# Patient Record
Sex: FEMALE
[De-identification: names, ages, dates, MRNs, and addresses within clinical notes are randomized per-mention and may not be internally consistent; named-entity substitution may affect disease eponyms.]

---

## 2018-01-26 ENCOUNTER — HOSPITAL ENCOUNTER (EMERGENCY)
Dept: HOSPITAL 14 - H.ER | Age: 63
LOS: 1 days | Discharge: HOME | End: 2018-01-27
Payer: MEDICARE

## 2018-01-26 VITALS — TEMPERATURE: 98 F

## 2018-01-26 VITALS — DIASTOLIC BLOOD PRESSURE: 58 MMHG | SYSTOLIC BLOOD PRESSURE: 112 MMHG

## 2018-01-26 VITALS — RESPIRATION RATE: 18 BRPM

## 2018-01-26 DIAGNOSIS — K52.9: Primary | ICD-10-CM

## 2018-01-26 LAB
ALBUMIN SERPL-MCNC: 4.2 G/DL (ref 3.5–5)
ALBUMIN/GLOB SERPL: 1 {RATIO} (ref 1–2.1)
ALT SERPL-CCNC: 77 U/L (ref 9–52)
AST SERPL-CCNC: 75 U/L (ref 14–36)
BASOPHILS # BLD AUTO: 0 K/UL (ref 0–0.2)
BASOPHILS NFR BLD: 0.5 % (ref 0–2)
BUN SERPL-MCNC: 24 MG/DL (ref 7–17)
CALCIUM SERPL-MCNC: 9.3 MG/DL (ref 8.4–10.2)
EOSINOPHIL # BLD AUTO: 0.1 K/UL (ref 0–0.7)
EOSINOPHIL NFR BLD: 0.5 % (ref 0–4)
ERYTHROCYTE [DISTWIDTH] IN BLOOD BY AUTOMATED COUNT: 14.7 % (ref 11.5–14.5)
GFR NON-AFRICAN AMERICAN: > 60
HGB BLD-MCNC: 12.1 G/DL (ref 12–16)
LIPASE SERPL-CCNC: 300 U/L (ref 23–300)
LYMPHOCYTES # BLD AUTO: 1.1 K/UL (ref 1–4.3)
LYMPHOCYTES NFR BLD AUTO: 11.2 % (ref 20–40)
MCH RBC QN AUTO: 28.3 PG (ref 27–31)
MCHC RBC AUTO-ENTMCNC: 31.9 G/DL (ref 33–37)
MCV RBC AUTO: 88.8 FL (ref 81–99)
MONOCYTES # BLD: 0.7 K/UL (ref 0–0.8)
MONOCYTES NFR BLD: 6.7 % (ref 0–10)
NEUTROPHILS # BLD: 8.3 K/UL (ref 1.8–7)
NEUTROPHILS NFR BLD AUTO: 81.1 % (ref 50–75)
NRBC BLD AUTO-RTO: 0.1 % (ref 0–0)
PLATELET # BLD: 151 K/UL (ref 130–400)
PMV BLD AUTO: 11.5 FL (ref 7.2–11.7)
RBC # BLD AUTO: 4.29 MIL/UL (ref 3.8–5.2)
WBC # BLD AUTO: 10.2 K/UL (ref 4.8–10.8)

## 2018-01-26 PROCEDURE — 84484 ASSAY OF TROPONIN QUANT: CPT

## 2018-01-26 PROCEDURE — 96361 HYDRATE IV INFUSION ADD-ON: CPT

## 2018-01-26 PROCEDURE — 80053 COMPREHEN METABOLIC PANEL: CPT

## 2018-01-26 PROCEDURE — 85025 COMPLETE CBC W/AUTO DIFF WBC: CPT

## 2018-01-26 PROCEDURE — 99285 EMERGENCY DEPT VISIT HI MDM: CPT

## 2018-01-26 PROCEDURE — 82948 REAGENT STRIP/BLOOD GLUCOSE: CPT

## 2018-01-26 PROCEDURE — 83690 ASSAY OF LIPASE: CPT

## 2018-01-26 PROCEDURE — 87804 INFLUENZA ASSAY W/OPTIC: CPT

## 2018-01-26 PROCEDURE — 96374 THER/PROPH/DIAG INJ IV PUSH: CPT

## 2018-01-26 PROCEDURE — 96375 TX/PRO/DX INJ NEW DRUG ADDON: CPT

## 2018-01-26 NOTE — ED PDOC
HPI:Nausea, Vomiting, Diarrhea


Time Seen by Provider: 01/26/18 22:11


Chief Complaint (Nursing): GI Problem


Chief Complaint (Provider): GI Problem


History Per: Patient


History/Exam Limitations: no limitations


Onset/Duration Of Symptoms: Hrs (x4-5)


Current Symptoms Are (Timing): Still Present


Additional Complaint(s): 





62 year old female with medical history of gastritis, who presents to the 

emergency department with a complaint of recurrent nausea and weakness 

associated with near-syncope, 2 episodes of non-bloody diarrhea and 3 episodes 

of vomiting ongoing since [id number] earlier today. Son reported that patient 

briefly loss consciousness mid-vomiting, however, patient stated she was fully 

aware during that episode. Denied any fever, chills, cough, shortness of breath 

or chest pain. Of note, patient was also given NS 200cc and Zofran 4mg IV by 

EMS on field. 





PMD: none provided





Past Medical History


Reviewed: Historical Data, Nursing Documentation, Vital Signs


Vital Signs: 


 Last Vital Signs











Temp  98.0 F   01/26/18 22:09


 


Pulse  72   01/26/18 22:25


 


Resp  18   01/26/18 22:25


 


BP  142/97 H  01/26/18 22:25


 


Pulse Ox  98   01/26/18 22:25














- Medical History


PMH: Gastritis





- Surgical History


Surgical History: No Surg Hx





- Family History


Family History: States: Unknown Family Hx





- Social History


Current smoker - smoking cessation education provided: No


Alcohol: None


Drugs: Denies





- Home Medications


Home Medications: 


 Ambulatory Orders











 Medication  Instructions  Recorded


 


Dicyclomine [Bentyl] 20 mg PO Q12 PRN #20 tab 01/27/18


 


Ondansetron ODT [Zofran ODT] 4 mg PO Q6 PRN #8 odt 01/27/18














- Allergies


Allergies/Adverse Reactions: 


 Allergies











Allergy/AdvReac Type Severity Reaction Status Date / Time


 


No Known Allergies Allergy   Verified 01/26/18 22:09














Review of Systems


ROS Statement: Except As Marked, All Systems Reviewed And Found Negative


Constitutional: Positive for: Weakness.  Negative for: Fever, Chills


Cardiovascular: Negative for: Chest Pain


Respiratory: Negative for: Cough, Shortness of Breath


Gastrointestinal: Positive for: Nausea, Vomiting (x3), Diarrhea (nonbloody x2)


Neurological: Positive for: Dizziness (near syncope)





Physical Exam





- Reviewed


Nursing Documentation Reviewed: Yes


Vital Signs Reviewed: Yes





- Physical Exam


Appears: Positive for: Non-toxic, Uncomfortable


Head Exam: Positive for: ATRAUMATIC, NORMAL INSPECTION, NORMOCEPHALIC


Skin: Positive for: Normal Color


Eye Exam: Positive for: Normal appearance, EOMI, PERRL.  Negative for: Nystagmus


ENT: Positive for: Normal ENT Inspection


Neck: Positive for: Normal, Painless ROM


Cardiovascular/Chest: Positive for: Chest Non Tender, Tachycardia.  Negative for

: Regular Rate, Rhythm, Bradycardia


Respiratory: Positive for: Normal Breath Sounds.  Negative for: Decreased 

Breath Sounds, Respiratory Distress


Gastrointestinal/Abdominal: Positive for: Normal Exam, Soft.  Negative for: 

Tenderness


Extremity: Positive for: Normal ROM (upper/lower)


Neurologic/Psych: Positive for: Alert (x3), Oriented





- Laboratory Results


Result Diagrams: 


 01/26/18 22:30





 01/26/18 22:30





- ECG


O2 Sat by Pulse Oximetry: 98 (RA)


Pulse Ox Interpretation: Normal





Medical Decision Making


Medical Decision Making: 





Initial Impression: vomiting; diarrhea; near-syncopal episode





Initial Plan:


* Alcohol serum


* CMP


* Drug screen, urine


* Lipase


* Troponin I 


* Urine dipstick


* CBC


* Bentyl 20mg PO


* Potassium chloride 100ml IVPB


* NS 1,000ml IV per 1,000mls/hr


* Accucheck


* Influenza A B


________________________________________________________________________________

_____





Time: 2225


--Rapid flu: negative for influenza


________________________________________________________________________________

_____





Time: 0134


--Upon provider reevaluation, patient is feeling better, medically stable and 

requires no further treatment in the ED at this time. Labs revealed no 

significant clinical abnormality. Patient will be discharged home with Rx for 

Bentyl 20mg and Zofran 4mg. Counseling was provided and all questions were 

answered regarding diagnosis. There is agreement to discharge plan. Return if 

symptoms persist or worsen.





Clinical Impression: Gastroenteritis





--------------------------------------------------------------------------------

-----------------


Scribe Attestation:


Documented by Ptaricia Avalos, acting as a scribe for Randolph Vega MD.





Provider Scribe Attestation:


All medical record entries made by the Scribe were at my direction and 

personally dictated by me. I have reviewed the chart and agree that the record 

accurately reflects my personal performance of the history, physical exam, 

medical decision making, and the department course for this patient. I have 

also personally directed, reviewed, and agree with the discharge instructions 

and disposition.





Disposition





- Clinical Impression


Clinical Impression: 


 Gastroenteritis








- Disposition


Disposition: Routine/Home


Disposition Time: 01:00


Condition: STABLE


Prescriptions: 


Dicyclomine [Bentyl] 20 mg PO Q12 PRN #20 tab


 PRN Reason: abdominal pain/diarrhea


Ondansetron ODT [Zofran ODT] 4 mg PO Q6 PRN #8 odt


 PRN Reason: Nausea/Vomiting


Instructions:  Gastroenteritis (ED)


Forms:  [facility] (English)


Print Language: [place] citizen

## 2018-01-27 VITALS — HEART RATE: 81 BPM

## 2018-01-28 VITALS — OXYGEN SATURATION: 98 %
